# Patient Record
Sex: FEMALE | URBAN - METROPOLITAN AREA
[De-identification: names, ages, dates, MRNs, and addresses within clinical notes are randomized per-mention and may not be internally consistent; named-entity substitution may affect disease eponyms.]

---

## 2022-12-01 ENCOUNTER — EMERGENCY (EMERGENCY)
Facility: HOSPITAL | Age: 32
LOS: 1 days | Discharge: HOME | End: 2022-12-01
Attending: EMERGENCY MEDICINE
Payer: COMMERCIAL

## 2022-12-01 VITALS
HEART RATE: 76 BPM | OXYGEN SATURATION: 99 % | DIASTOLIC BLOOD PRESSURE: 70 MMHG | SYSTOLIC BLOOD PRESSURE: 120 MMHG | TEMPERATURE: 98 F | RESPIRATION RATE: 18 BRPM

## 2022-12-01 VITALS
RESPIRATION RATE: 18 BRPM | WEIGHT: 234.13 LBS | DIASTOLIC BLOOD PRESSURE: 74 MMHG | HEART RATE: 86 BPM | TEMPERATURE: 98 F | SYSTOLIC BLOOD PRESSURE: 131 MMHG | OXYGEN SATURATION: 98 %

## 2022-12-01 LAB
ALBUMIN SERPL ELPH-MCNC: 3.4 G/DL — LOW (ref 3.5–5)
ALP SERPL-CCNC: 63 U/L — SIGNIFICANT CHANGE UP (ref 40–120)
ALT FLD-CCNC: 27 U/L DA — SIGNIFICANT CHANGE UP (ref 10–60)
ANION GAP SERPL CALC-SCNC: 7 MMOL/L — SIGNIFICANT CHANGE UP (ref 5–17)
APTT BLD: 29.6 SEC — SIGNIFICANT CHANGE UP (ref 27.5–35.5)
AST SERPL-CCNC: 27 U/L — SIGNIFICANT CHANGE UP (ref 10–40)
BASOPHILS # BLD AUTO: 0.01 K/UL — SIGNIFICANT CHANGE UP (ref 0–0.2)
BASOPHILS NFR BLD AUTO: 0.1 % — SIGNIFICANT CHANGE UP (ref 0–2)
BILIRUB SERPL-MCNC: 0.4 MG/DL — SIGNIFICANT CHANGE UP (ref 0.2–1.2)
BUN SERPL-MCNC: 11 MG/DL — SIGNIFICANT CHANGE UP (ref 7–18)
CALCIUM SERPL-MCNC: 9.4 MG/DL — SIGNIFICANT CHANGE UP (ref 8.4–10.5)
CHLORIDE SERPL-SCNC: 107 MMOL/L — SIGNIFICANT CHANGE UP (ref 96–108)
CO2 SERPL-SCNC: 25 MMOL/L — SIGNIFICANT CHANGE UP (ref 22–31)
CREAT SERPL-MCNC: 0.77 MG/DL — SIGNIFICANT CHANGE UP (ref 0.5–1.3)
EGFR: 106 ML/MIN/1.73M2 — SIGNIFICANT CHANGE UP
EOSINOPHIL # BLD AUTO: 0.04 K/UL — SIGNIFICANT CHANGE UP (ref 0–0.5)
EOSINOPHIL NFR BLD AUTO: 0.4 % — SIGNIFICANT CHANGE UP (ref 0–6)
GLUCOSE SERPL-MCNC: 101 MG/DL — HIGH (ref 70–99)
HCT VFR BLD CALC: 41.4 % — SIGNIFICANT CHANGE UP (ref 34.5–45)
HGB BLD-MCNC: 13.3 G/DL — SIGNIFICANT CHANGE UP (ref 11.5–15.5)
HIV 1+2 AB+HIV1 P24 AG SERPL QL IA: SIGNIFICANT CHANGE UP
IMM GRANULOCYTES NFR BLD AUTO: 0.4 % — SIGNIFICANT CHANGE UP (ref 0–0.9)
INR BLD: 1.07 RATIO — SIGNIFICANT CHANGE UP (ref 0.88–1.16)
LYMPHOCYTES # BLD AUTO: 3.03 K/UL — SIGNIFICANT CHANGE UP (ref 1–3.3)
LYMPHOCYTES # BLD AUTO: 30.5 % — SIGNIFICANT CHANGE UP (ref 13–44)
MCHC RBC-ENTMCNC: 27.6 PG — SIGNIFICANT CHANGE UP (ref 27–34)
MCHC RBC-ENTMCNC: 32.1 GM/DL — SIGNIFICANT CHANGE UP (ref 32–36)
MCV RBC AUTO: 85.9 FL — SIGNIFICANT CHANGE UP (ref 80–100)
MONOCYTES # BLD AUTO: 0.64 K/UL — SIGNIFICANT CHANGE UP (ref 0–0.9)
MONOCYTES NFR BLD AUTO: 6.5 % — SIGNIFICANT CHANGE UP (ref 2–14)
NEUTROPHILS # BLD AUTO: 6.16 K/UL — SIGNIFICANT CHANGE UP (ref 1.8–7.4)
NEUTROPHILS NFR BLD AUTO: 62.1 % — SIGNIFICANT CHANGE UP (ref 43–77)
NRBC # BLD: 0 /100 WBCS — SIGNIFICANT CHANGE UP (ref 0–0)
PLATELET # BLD AUTO: 278 K/UL — SIGNIFICANT CHANGE UP (ref 150–400)
POTASSIUM SERPL-MCNC: 5 MMOL/L — SIGNIFICANT CHANGE UP (ref 3.5–5.3)
POTASSIUM SERPL-SCNC: 5 MMOL/L — SIGNIFICANT CHANGE UP (ref 3.5–5.3)
PROT SERPL-MCNC: 7.5 G/DL — SIGNIFICANT CHANGE UP (ref 6–8.3)
PROTHROM AB SERPL-ACNC: 12.7 SEC — SIGNIFICANT CHANGE UP (ref 10.5–13.4)
RBC # BLD: 4.82 M/UL — SIGNIFICANT CHANGE UP (ref 3.8–5.2)
RBC # FLD: 12.4 % — SIGNIFICANT CHANGE UP (ref 10.3–14.5)
SARS-COV-2 RNA SPEC QL NAA+PROBE: SIGNIFICANT CHANGE UP
SODIUM SERPL-SCNC: 139 MMOL/L — SIGNIFICANT CHANGE UP (ref 135–145)
TROPONIN I, HIGH SENSITIVITY RESULT: 4.6 NG/L — SIGNIFICANT CHANGE UP
WBC # BLD: 9.92 K/UL — SIGNIFICANT CHANGE UP (ref 3.8–10.5)
WBC # FLD AUTO: 9.92 K/UL — SIGNIFICANT CHANGE UP (ref 3.8–10.5)

## 2022-12-01 PROCEDURE — 0042T: CPT | Mod: MA

## 2022-12-01 PROCEDURE — 93010 ELECTROCARDIOGRAM REPORT: CPT

## 2022-12-01 PROCEDURE — 93005 ELECTROCARDIOGRAM TRACING: CPT

## 2022-12-01 PROCEDURE — 80053 COMPREHEN METABOLIC PANEL: CPT

## 2022-12-01 PROCEDURE — 87635 SARS-COV-2 COVID-19 AMP PRB: CPT

## 2022-12-01 PROCEDURE — 99285 EMERGENCY DEPT VISIT HI MDM: CPT | Mod: 25

## 2022-12-01 PROCEDURE — 84484 ASSAY OF TROPONIN QUANT: CPT

## 2022-12-01 PROCEDURE — 99285 EMERGENCY DEPT VISIT HI MDM: CPT

## 2022-12-01 PROCEDURE — 85025 COMPLETE CBC W/AUTO DIFF WBC: CPT

## 2022-12-01 PROCEDURE — 70498 CT ANGIOGRAPHY NECK: CPT | Mod: MA

## 2022-12-01 PROCEDURE — 85610 PROTHROMBIN TIME: CPT

## 2022-12-01 PROCEDURE — 70450 CT HEAD/BRAIN W/O DYE: CPT | Mod: MA

## 2022-12-01 PROCEDURE — 36415 COLL VENOUS BLD VENIPUNCTURE: CPT

## 2022-12-01 PROCEDURE — 82962 GLUCOSE BLOOD TEST: CPT

## 2022-12-01 PROCEDURE — 87389 HIV-1 AG W/HIV-1&-2 AB AG IA: CPT

## 2022-12-01 PROCEDURE — 70496 CT ANGIOGRAPHY HEAD: CPT | Mod: MA

## 2022-12-01 PROCEDURE — 70498 CT ANGIOGRAPHY NECK: CPT | Mod: 26,MA

## 2022-12-01 PROCEDURE — 85730 THROMBOPLASTIN TIME PARTIAL: CPT

## 2022-12-01 PROCEDURE — 70496 CT ANGIOGRAPHY HEAD: CPT | Mod: 26,MA

## 2022-12-01 NOTE — ED PROVIDER NOTE - PATIENT PORTAL LINK FT
You can access the FollowMyHealth Patient Portal offered by [facility] by registering at the following website: [email or web address]. By joining [employer]’s FollowMyHealth portal, you will also be able to view your health information using other applications (apps) compatible with our system.

## 2022-12-01 NOTE — ED PROVIDER NOTE - OBJECTIVE STATEMENT
Patient with left facial and arm numbness since 9:15 am today. also chest pain. patient has had similar symptoms in past and had neuro workup. Recently 2 weeks ago neurologist prescribed medication for migraine which patient started taking. no cough no URI symptoms. Patient also experienced left eye blurry vision and droopiness of left eyelid. no headache currenlty. EMD gave 325 aspirin

## 2022-12-01 NOTE — ED ADULT NURSE NOTE - OBJECTIVE STATEMENT
Pt arrived to ED ,[facility] from work ,after has had an episode of droopy eyes and Left arm numbness, around 0915 today  On arrival to  ED .,pt moves all extremities and speech is normal, noted  anxious, crying

## 2022-12-01 NOTE — ED PROVIDER NOTE - NSFOLLOWUPINSTRUCTIONS_ED_ALL_ED_FT
Paresthesia      Paresthesia is an abnormal burning or prickling sensation. It is usually felt in the hands, arms, legs, or feet. However, it may occur in any part of the body. Usually, paresthesia is not painful. It may feel like:  •Tingling or numbness.      •Buzzing.      •Itching.      Paresthesia may occur without any clear cause, or it may be caused by:  •Breathing too quickly (hyperventilation).      •Pressure on a nerve.      •An underlying medical condition.      •Side effects of a medication.      •Nutritional deficiencies.      •Exposure to toxic chemicals.      Most people experience temporary (transient) paresthesia at some time in their lives. For some people, it may be long-lasting (chronic) because of an underlying medical condition. If you have paresthesia that lasts a long time, you need to be evaluated by your health care provider.      Follow these instructions at home:      Alcohol use    • Do not drink alcohol if:  •Your health care provider tells you not to drink.      •You are pregnant, may be pregnant, or are planning to become pregnant.      •If you drink alcohol:•Limit how much you use to:  •0–1 drink a day for women.      •0–2 drinks a day for men.        •Be aware of how much alcohol is in your drink. In the [place] one drink equals one 12 oz bottle of beer (355 mL), one 5 oz glass of wine (148 mL), or one 1½ oz glass of hard liquor (44 mL).          Nutrition    •Eat a healthy diet. This includes:  •Eating foods that are high in fiber, such as fresh fruits and vegetables, whole grains, and beans.      •Limiting foods that are high in fat and processed sugars, such as fried or sweet foods.        General instructions     •Take over-the-counter and prescription medicines only as told by your health care provider.      • Do not use any products that contain nicotine or tobacco, such as cigarettes and e-cigarettes. These can keep blood from reaching damaged nerves. If you need help quitting, ask your health care provider.      •If you have diabetes, work closely with your health care provider to keep your blood sugar under control.    •If you have numbness in your feet:  •Check every day for signs of injury or infection. Watch for redness, warmth, and swelling.      •Wear padded socks and comfortable shoes. These help protect your feet.        •Keep all follow-up visits as told by your health care provider. This is important.        Contact a health care provider if you:    •Have paresthesia that gets worse or does not go away.      •Have numbness after an injury.      •Have a burning or prickling feeling that gets worse when you walk.      •Have pain, cramps, or dizziness, or you faint.      •Develop a rash.        Get help right away if you:    •Feel muscle weakness.      •Develop new weakness in an arm or leg.      •Have trouble walking or moving.      •Have problems with speech, understanding, or vision.      •Feel confused.      •Cannot control your bladder or bowel movements.        Summary    •Paresthesia is an abnormal burning or prickling sensation that is usually felt in the hands, arms, legs, or feet. It may also occur in other parts of the body.      •Paresthesia may occur without any clear cause, or it may be caused by breathing too quickly (hyperventilation), pressure on a nerve, an underlying medical condition, side effects of a medication, nutritional deficiencies, or exposure to toxic chemicals.      •If you have paresthesia that lasts a long time, you need to be evaluated by your health care provider.      This information is not intended to replace advice given to you by your health care provider. Make sure you discuss any questions you have with your health care provider.

## 2022-12-01 NOTE — CHART NOTE - NSCHARTNOTEFT_GEN_A_CORE
Briefly:     Patient is a 31yo RH lady who presents to [facility] regarding complaints of left facial and arm numbness since 9:15 am today in the setting of chest pain. Patient has had similar symptoms in past and had neuro workup and had been diagnosed with ocular migraines. Recently 2 weeks ago neurologist prescribed medication for migraine which patient started taking. no cough no URI symptoms. Patient also experienced left eye blurry vision and droopiness of left eyelid which has since resolved. Patient denies any headache currenlty. EMD gave 325 aspirin en route. NIHSS is 1 for L hemisensory disturbance and confirmed with Telestroke examination.      Imaging:    CT Brain Stroke Protocol (12.01.22 @ 10:19)     Impression:  Unremarkable noncontrast head CT.      CT Brain Perfusion Maps Stroke (12.01.22 @ 10:56)     IMPRESSION:   Unremarkable CTA of the neck and [place].  No perfusion mismatch to suggest core infarct or critically hypoperfused   tissue at risk.      Impression: L Hemisensory disturbance unlikely to be     Recommendations:    No TPA given due to mild and nondisabling deficit in addition to actively resolving symptoms  No MT arranged due to no evidence of LVO  Further care per ED team Briefly:     Patient is a 31yo RH lady who presents to [facility] regarding complaints of left facial and arm numbness since 9:15 am today in the setting of chest pain. Patient has had similar symptoms in past and had neuro workup and had been diagnosed with ocular migraines. Recently 2 weeks ago neurologist prescribed medication for migraine which patient started taking. no cough no URI symptoms. Patient also experienced left eye blurry vision and droopiness of left eyelid which has since resolved. Patient denies any headache currenlty. EMD gave 325 aspirin en route. NIHSS is 1 for L hemisensory disturbance and confirmed with Telestroke examination.      Imaging:    CT Brain Stroke Protocol (12.01.22 @ 10:19)     Impression:  Unremarkable noncontrast head CT.      CT Brain Perfusion Maps Stroke (12.01.22 @ 10:56)     IMPRESSION:   Unremarkable CTA of the neck and [place].  No perfusion mismatch to suggest core infarct or critically hypoperfused   tissue at risk.      Impression: L Hemisensory disturbance unlikely to be due to ischemic event, may represent migrainous phenomenon    Recommendations:    No TPA given due to mild and nondisabling deficit in addition to actively resolving symptoms  No MT arranged due to no evidence of LVO  Further care per ED team

## 2022-12-01 NOTE — ED PROVIDER NOTE - PHYSICAL EXAMINATION
awake alert, NIHSS 1, strength intact, face symmetric, speech clear. EOMI PERRLA. subjective diminished sensation to left face arm and leg. no pronator drift, intact finger to nose

## 2022-12-01 NOTE — ED PROVIDER NOTE - CLINICAL SUMMARY MEDICAL DECISION MAKING FREE TEXT BOX
patient with paresthesia and chest pain. code stroke called. stroke neurologist consulted. TPA not recommended based on only sensory deficit. passed dysphagia evaluation. admission recommended for further neurological and cardiac workup, patient declined, will followup with own. primary care physician. symptoms resolved.

## 2024-11-22 ENCOUNTER — EMERGENCY (EMERGENCY)
Facility: HOSPITAL | Age: 34
LOS: 1 days | Discharge: HOME | End: 2024-11-22
Attending: STUDENT IN AN ORGANIZED HEALTH CARE EDUCATION/TRAINING PROGRAM
Payer: SELF-PAY

## 2024-11-22 VITALS
RESPIRATION RATE: 18 BRPM | HEIGHT: 62 IN | OXYGEN SATURATION: 97 % | TEMPERATURE: 98 F | HEART RATE: 71 BPM | DIASTOLIC BLOOD PRESSURE: 93 MMHG | SYSTOLIC BLOOD PRESSURE: 144 MMHG | WEIGHT: 229.94 LBS

## 2024-11-22 VITALS
OXYGEN SATURATION: 98 % | SYSTOLIC BLOOD PRESSURE: 105 MMHG | DIASTOLIC BLOOD PRESSURE: 68 MMHG | HEART RATE: 76 BPM | TEMPERATURE: 98 F | RESPIRATION RATE: 18 BRPM

## 2024-11-22 LAB
ALBUMIN SERPL ELPH-MCNC: 3.5 G/DL — SIGNIFICANT CHANGE UP (ref 3.5–5)
ALP SERPL-CCNC: 61 U/L — SIGNIFICANT CHANGE UP (ref 40–120)
ALT FLD-CCNC: 28 U/L DA — SIGNIFICANT CHANGE UP (ref 10–60)
ANION GAP SERPL CALC-SCNC: 5 MMOL/L — SIGNIFICANT CHANGE UP (ref 5–17)
APTT BLD: 33.7 SEC — SIGNIFICANT CHANGE UP (ref 24.5–35.6)
AST SERPL-CCNC: 15 U/L — SIGNIFICANT CHANGE UP (ref 10–40)
BASOPHILS # BLD AUTO: 0.02 K/UL — SIGNIFICANT CHANGE UP (ref 0–0.2)
BASOPHILS NFR BLD AUTO: 0.2 % — SIGNIFICANT CHANGE UP (ref 0–2)
BILIRUB SERPL-MCNC: 0.3 MG/DL — SIGNIFICANT CHANGE UP (ref 0.2–1.2)
BUN SERPL-MCNC: 15 MG/DL — SIGNIFICANT CHANGE UP (ref 7–18)
CALCIUM SERPL-MCNC: 8.5 MG/DL — SIGNIFICANT CHANGE UP (ref 8.4–10.5)
CHLORIDE SERPL-SCNC: 107 MMOL/L — SIGNIFICANT CHANGE UP (ref 96–108)
CO2 SERPL-SCNC: 25 MMOL/L — SIGNIFICANT CHANGE UP (ref 22–31)
CREAT SERPL-MCNC: 0.67 MG/DL — SIGNIFICANT CHANGE UP (ref 0.5–1.3)
EGFR: 119 ML/MIN/1.73M2 — SIGNIFICANT CHANGE UP
EOSINOPHIL # BLD AUTO: 0.04 K/UL — SIGNIFICANT CHANGE UP (ref 0–0.5)
EOSINOPHIL NFR BLD AUTO: 0.4 % — SIGNIFICANT CHANGE UP (ref 0–6)
GLUCOSE SERPL-MCNC: 96 MG/DL — SIGNIFICANT CHANGE UP (ref 70–99)
HCG SERPL-ACNC: <1 MIU/ML — SIGNIFICANT CHANGE UP
HCT VFR BLD CALC: 36.3 % — SIGNIFICANT CHANGE UP (ref 34.5–45)
HGB BLD-MCNC: 12.5 G/DL — SIGNIFICANT CHANGE UP (ref 11.5–15.5)
IMM GRANULOCYTES NFR BLD AUTO: 0.3 % — SIGNIFICANT CHANGE UP (ref 0–0.9)
INR BLD: 1.08 RATIO — SIGNIFICANT CHANGE UP (ref 0.85–1.16)
LYMPHOCYTES # BLD AUTO: 3.34 K/UL — HIGH (ref 1–3.3)
LYMPHOCYTES # BLD AUTO: 32.7 % — SIGNIFICANT CHANGE UP (ref 13–44)
MCHC RBC-ENTMCNC: 29.3 PG — SIGNIFICANT CHANGE UP (ref 27–34)
MCHC RBC-ENTMCNC: 34.4 G/DL — SIGNIFICANT CHANGE UP (ref 32–36)
MCV RBC AUTO: 85 FL — SIGNIFICANT CHANGE UP (ref 80–100)
MONOCYTES # BLD AUTO: 0.63 K/UL — SIGNIFICANT CHANGE UP (ref 0–0.9)
MONOCYTES NFR BLD AUTO: 6.2 % — SIGNIFICANT CHANGE UP (ref 2–14)
NEUTROPHILS # BLD AUTO: 6.16 K/UL — SIGNIFICANT CHANGE UP (ref 1.8–7.4)
NEUTROPHILS NFR BLD AUTO: 60.2 % — SIGNIFICANT CHANGE UP (ref 43–77)
NRBC # BLD: 0 /100 WBCS — SIGNIFICANT CHANGE UP (ref 0–0)
PLATELET # BLD AUTO: 261 K/UL — SIGNIFICANT CHANGE UP (ref 150–400)
POTASSIUM SERPL-MCNC: 3.8 MMOL/L — SIGNIFICANT CHANGE UP (ref 3.5–5.3)
POTASSIUM SERPL-SCNC: 3.8 MMOL/L — SIGNIFICANT CHANGE UP (ref 3.5–5.3)
PROT SERPL-MCNC: 7.1 G/DL — SIGNIFICANT CHANGE UP (ref 6–8.3)
PROTHROM AB SERPL-ACNC: 12.5 SEC — SIGNIFICANT CHANGE UP (ref 9.9–13.4)
RBC # BLD: 4.27 M/UL — SIGNIFICANT CHANGE UP (ref 3.8–5.2)
RBC # FLD: 11.9 % — SIGNIFICANT CHANGE UP (ref 10.3–14.5)
SODIUM SERPL-SCNC: 137 MMOL/L — SIGNIFICANT CHANGE UP (ref 135–145)
TROPONIN I, HIGH SENSITIVITY RESULT: 4.9 NG/L — SIGNIFICANT CHANGE UP
WBC # BLD: 10.22 K/UL — SIGNIFICANT CHANGE UP (ref 3.8–10.5)
WBC # FLD AUTO: 10.22 K/UL — SIGNIFICANT CHANGE UP (ref 3.8–10.5)

## 2024-11-22 PROCEDURE — 84484 ASSAY OF TROPONIN QUANT: CPT

## 2024-11-22 PROCEDURE — 70498 CT ANGIOGRAPHY NECK: CPT | Mod: MC

## 2024-11-22 PROCEDURE — 70450 CT HEAD/BRAIN W/O DYE: CPT | Mod: 26,MC

## 2024-11-22 PROCEDURE — 99284 EMERGENCY DEPT VISIT MOD MDM: CPT | Mod: 25

## 2024-11-22 PROCEDURE — 0042T: CPT | Mod: MC

## 2024-11-22 PROCEDURE — 96374 THER/PROPH/DIAG INJ IV PUSH: CPT | Mod: XU

## 2024-11-22 PROCEDURE — 99291 CRITICAL CARE FIRST HOUR: CPT

## 2024-11-22 PROCEDURE — 84702 CHORIONIC GONADOTROPIN TEST: CPT

## 2024-11-22 PROCEDURE — 70496 CT ANGIOGRAPHY HEAD: CPT | Mod: MC

## 2024-11-22 PROCEDURE — 85730 THROMBOPLASTIN TIME PARTIAL: CPT

## 2024-11-22 PROCEDURE — 36415 COLL VENOUS BLD VENIPUNCTURE: CPT

## 2024-11-22 PROCEDURE — 99053 MED SERV 10PM-8AM 24 HR FAC: CPT

## 2024-11-22 PROCEDURE — 70498 CT ANGIOGRAPHY NECK: CPT | Mod: 26,MC

## 2024-11-22 PROCEDURE — 82962 GLUCOSE BLOOD TEST: CPT

## 2024-11-22 PROCEDURE — 70450 CT HEAD/BRAIN W/O DYE: CPT | Mod: MC

## 2024-11-22 PROCEDURE — 85025 COMPLETE CBC W/AUTO DIFF WBC: CPT

## 2024-11-22 PROCEDURE — 70496 CT ANGIOGRAPHY HEAD: CPT | Mod: 26,MC

## 2024-11-22 PROCEDURE — 85610 PROTHROMBIN TIME: CPT

## 2024-11-22 PROCEDURE — 80053 COMPREHEN METABOLIC PANEL: CPT

## 2024-11-22 RX ORDER — METOCLOPRAMIDE HCL 10 MG
10 TABLET ORAL ONCE
Refills: 0 | Status: DISCONTINUED | OUTPATIENT
Start: 2024-11-22 | End: 2024-11-22

## 2024-11-22 RX ORDER — ACETAMINOPHEN 500 MG
975 TABLET ORAL ONCE
Refills: 0 | Status: COMPLETED | OUTPATIENT
Start: 2024-11-22 | End: 2024-11-22

## 2024-11-22 RX ORDER — SODIUM CHLORIDE 9 MG/ML
1000 INJECTION, SOLUTION INTRAMUSCULAR; INTRAVENOUS; SUBCUTANEOUS ONCE
Refills: 0 | Status: COMPLETED | OUTPATIENT
Start: 2024-11-22 | End: 2024-11-22

## 2024-11-22 RX ORDER — SODIUM CHLORIDE 9 MG/ML
1000 INJECTION, SOLUTION INTRAMUSCULAR; INTRAVENOUS; SUBCUTANEOUS ONCE
Refills: 0 | Status: DISCONTINUED | OUTPATIENT
Start: 2024-11-22 | End: 2024-11-22

## 2024-11-22 RX ORDER — METOCLOPRAMIDE HCL 10 MG
10 TABLET ORAL ONCE
Refills: 0 | Status: COMPLETED | OUTPATIENT
Start: 2024-11-22 | End: 2024-11-22

## 2024-11-22 RX ORDER — ACETAMINOPHEN 500 MG
975 TABLET ORAL ONCE
Refills: 0 | Status: DISCONTINUED | OUTPATIENT
Start: 2024-11-22 | End: 2024-11-22

## 2024-11-22 RX ADMIN — Medication 10 MILLIGRAM(S): at 05:22

## 2024-11-22 RX ADMIN — SODIUM CHLORIDE 1000 MILLILITER(S): 9 INJECTION, SOLUTION INTRAMUSCULAR; INTRAVENOUS; SUBCUTANEOUS at 05:23

## 2024-11-22 RX ADMIN — Medication 975 MILLIGRAM(S): at 05:22

## 2024-11-22 NOTE — ED PROVIDER NOTE - OBJECTIVE STATEMENT
34-year-old female, hx of migraines (with L hemisensory/visual disturbances), presenting with slurred speech and numbness of bilateral arms/legs, LKN 1:15AM. Also has headaches and nausea. Denies focal weakness. No other neurologic sympoms, She was seen in the ED 2 years ago - code stroke was called for L hemisensory disturbance, normal CTH/CTA, was seen by telestroke team, neurologist impression was migrainous phenomenon resulting in L hemisensory disturbance.    Code stroke activated in triage. FSG 96. /93.

## 2024-11-22 NOTE — ED ADULT TRIAGE NOTE - CODE STROKE DETAILS
Numbness  of all extremities , and left side face numbness .Pt has difficulty speaking .  .CODE STROKE activated

## 2024-11-22 NOTE — ED PROVIDER NOTE - PATIENT PORTAL LINK FT
You can access the FollowMyHealth Patient Portal offered by [facility] by registering at the following website: [email or web address]. By joining [employer]’s FollowMyHealth portal, you will also be able to view your health information using other applications (apps) compatible with our system.

## 2024-11-22 NOTE — ED ADULT TRIAGE NOTE - CHIEF COMPLAINT QUOTE
[facility]  reports that   pt Feeling Numbness  in both arms and both legs and face  and having difficulty speaking  started  1 hour ago .  Pt c/o Chest pain and head ache  about 1 hour ago

## 2024-11-22 NOTE — ED PROVIDER NOTE - NSFOLLOWUPINSTRUCTIONS_ED_ALL_ED_FT
You were seen in the emergency department for: slurred speech/numbness  Your results report is attached. Your symptoms may be due to a complex migraine.   Please take Tylenol 1000mg every 6 hours as needed or Ibuprofen 600mg every 6 hours as needed - you can alternate every 3 hours as needed.   Make sure you stay hydrated.  You will be contacted by the Emergency Department Referrals Coordinator to set up your follow-up appointment within 24-48 hours of your discharge, Monday to Friday. We recommend you follow up with: Neurology    Please return to the Emergency Department if you experience any of the following symptoms:   - Shortness of breath or trouble breathing  - Pressure, pain or tightness in the chest  - Face drooping, arm weakness or speech difficulty  - Persistence of severe vomiting  - Head injury or loss of consciousness  - Nonstop bleeding or an open wound    (1) Follow up with your primary care physician within the next 24-48 hours as discussed. In addition, we did not find evidence of a life threatening illness on your testing here today, but listed below are the specialists that will be necessary to see as an outpatient to continue the workup.  Please call the numbers listed below or [phone] to set up the necessary appointments.  (2) Take Tylenol (up to 1000mg or 1 g)  and/or Motrin (up to 600mg) up to every 6 hours as needed for pain.   (3) If you had an IV (intravenous) line placed, it was removed. Sometimes, after IV removal, that area can be tender for a few days; if it develops redness and swelling, those could be signs of infection; in which case, return to the Emergency Department for assessment.  (4) Please continue taking all of your home medications as directed.

## 2024-11-22 NOTE — ED PROVIDER NOTE - NEUROLOGICAL, MLM
Alert and oriented, no focal deficits, no motor or sensory deficits. +slurred speech - slow but intelligible.

## 2024-11-22 NOTE — ED PROVIDER NOTE - NSFOLLOWUPCLINICS_GEN_ALL_ED_FT
[facility]  Neurology  [address], [address]  [place] [address]  Phone: [phone]  Fax:     [facility]  Neurology  [address] [place] [id number]  Phone: [phone]  Fax:     [facility]  Neurology  [address] [place] [address]  Phone: [phone]  Fax: [phone]

## 2024-11-22 NOTE — ED PROVIDER NOTE - PRINCIPAL DIAGNOSIS
1/26/2021 Patient: Neris Crooks. YOB: 1971 Date of Visit: 1/5/2021 Jody Staples MD 
[address] [place] [address] [address] [facility] [address] [address] [employer] Reyna Lundy MD 
[address] [address] [address] Fax: [phone] Dear MD Reyna Best MD, Thank you for referring Mr. Thelma Springer to  [email or web address]  for evaluation. My notes for this consultation are attached. If you have questions, please do not hesitate to call me. I look forward to following your patient along with you.  
 
 
Sincerely, 
 
Alejo Begum MD 
 

Migraine

## 2024-11-22 NOTE — CHART NOTE - NSCHARTNOTEFT_GEN_A_CORE
Patient was seen over televideo on 11/22/2024 at 3:35 am  HISTORY OF PRESENT ILLNESS:  Patient is a 35 y/o F with a PMHx of complex migraines, who p/w an acute onset of dysarthria, LKW at 1 am. Patient states that she was getting ready for bed, when she suddenly felt numbness on both sides of the body, as well as nausea, dizziness, and headache. She felt chest pain and also had trouble swallowing. She realized that she felt slower than usual and that her speech was coming out slurred. Patient states that she had a similar episode of dysarthria with her previous migraines. She notes that over the past week she has been getting on and off headaches.       PMH/PSH is PAST MEDICAL & SURGICAL HISTORY:      Pre-stroke MRS=      *Modified [place] Score   0 - No symptoms.    OUTSIDE HOSPITAL COURSE:      HOME MEDICATIONS:  Home Medications:        SMOKING/ETOH/RECREATIONAL DRUGS:      VITALS/DATA/ORDERS: [x] Reviewed    CT Head: No acute intracranial hemorrhage, mass effect, or CT evidence of an acute   vascular territorial infarct.  CT PERFUSION:  Technical limitations: None.    Core infarction: 0 ml  Penumbra / tissue at risk for active ischemia: 0 ml    CTA NECK:  No evidence of significant stenosis or occlusion.    CTA HEAD:  No large vessel occlusion, significant stenosis or vascular abnormality   identified.    Labs:  CAPILLARY BLOOD GLUCOSE      POCT Blood Glucose.: 96 mg/dL (22 Nov 2024 02:15)        EXAMINATION: Assisted by nurse Eileen  National Institutes of Health (NIH) Stroke Scale  .....................................................................................................  1a. Assess Level of Consciousness (Alert=0, Coma=3)  Alert (0 points)    1b. Assess Orientation: Month, Age (1 point per bad answer)  Answers both questions correctly (0 points)    1c. Follow Commands: Open and close eyes, make fist and release (1 point per command NOT obeyed)  Performs both tasks correctly (0 points)    2. Follow my finger (Normal=0, Forced deviation=2)  Normal (0 points)    3. Visual field (Normal=0, hemianopia=2, bilateral loss=3)  No visual loss (0 points)    4. Facial palsy:  Show teeth, Raise eyebrows, Squeeze eyes shut (Normal=0, Complete=3)  Normal (0 points)    5a. Motor Strength Left Arm: Elevate to 90 degrees  No drift (0 points)    5b. Motor Strength Right Arm: Elevate to 90 degrees  No drift (0 points)    6a. Motor Strength Left Leg: Elevate to 30 degrees  No drift (0 points)    6b. Motor Strength Right Leg: Elevate to 30 degrees  No drift (0 points)    7. Coordination or limb ataxia: Finger-nose-finger, Heel-knee-shin (Absent=0, both limbs=2)  Absent (0 points)    8. Sensory: Pin prick to face, arm, trunk, and legs, Compare sides (Normal=0, Severe loss=2)  Mild to moderate loss (1 point)    9. Language: Name items, Describe picture, Read sentences (No Aphasia=0, Mute=3)  No aphasia (0 points)    10. Dysarthria: Speech clarity while reading word list (Normal=0, Nearly unintelligible=2)  Mild to moderate dysarthria (1 point)    11. Extinction and Inattention: Formerly called 'Neglect' (None=0, Complete=2)  No abnormality (0 points)    .....................................................................................................  Total score: 2 (maximum=42)    NEUROLOGIC EXAMINATION   General:  Appearance is consistent with chronologic age.  No abnormal facies.   Mental status: The patient is oriented to person, place, time and date.   Fund of knowledge is intact and normal. Speech is slightly hesitant at times with fluctuating dysarthria.. Able to name all objects and can describe a picture. No aphasia. Follows simple commands.  Cranial nerves: Visual fields are intact to finger counting. EOMI w/o nystagmus or skew.  PERRL.  No ptosis.  No facial asymmetry.  Decreased sensation to light touch on the left side of the face. Slight twitching of the left eye at times which patient notes occurs with her usual migraines.  Motor examination:   No tremors or involuntary movements. No drifts.  Sensory examination:   slightly decreased to light touch on both arms   Cerebellum:   FTN is intact        IV Alteplase CONTRAINDICATIONS  [] None    ABSOLUTE EXCLUSION CRITERIA:  [] Patient outside of the appropriate time window for IV alteplase  [] Evidence of intracranial hemorrhage on pretreatment CT scan (CT must be read by an attending radiologist or attending neurologist)  [] Head CT findings suggesting an established acute cerebral infarction as evidenced by sofia hypodensity, regardless of size.  [] Clinical presentation consistent with subarachnoid hemorrhage, even with normal CT scan  [] Active internal bleeding  [] Known bleeding diathesis (including but not limited to platelet count < 100,000, use of oral anticoagulants with INR>1.7, use of full dose low molecular       weight heparin within the last 24 hours, use of unfractionated heparin AND a prolonged PTT (> 40sec), use of direct thrombin inhibitor (e.g. dabigatran) or oral direct Factor Xa inhibitor (e.g. rivaroxiban, apixaban) within 48 hours)  [] Systolic pressure >= 185 mmHg or diastolic pressure 110 mmHg on repeated measurements at the time treatment is to begin  [] Care team unable to determine eligibility for IV alteplase  [] Patient, family, or surrogate declines and understands risks and benefits of treatment.  [] For wake-up Protocol patients: DW-MRI lesions larger than one-third of the MCA territory      RELATIVE EXCLUSION CRITERIA    [] Isolated neurological deficits (except for aphasia or hemianopsia)  [x] stroke severity too mild (non-disabling)  [] Seizure at onset with post-ictal residual neurological impairment  [] Gastrointestinal, genitourinary or respiratory hemorrhage within 21 days   [] Major surgery within 14 days   [] Blood glucose level < 50 or > 400 mG/dL  [] CPR with chest compressions or minor surgery (including liver and kidney biopsy, thoracocentesis, lumbar puncture) within 10 days   [] Arterial puncture at non-compressible site within 7 days   [] Evidence of acute trauma (fracture)   [] Diabetic hemorrhagic retinopathy or ophthalmic bleeding  [] Pregnancy or peripartum; no nursing post- treatment  [] Post-myocardial infarction pericarditis  [] Peritoneal dialysis or hemodialysis or sever hepatic disease   [] Known bacterial endocarditis   [] Life expectancy less than 6 months or sever co-morbid illness   [] Known cerebral vascular malformation, untreated intracranial aneurysm or brain tumor (may consider IV alteplase in patients with CNS lesions that have a very low likelihood of hemorrhage, such as small un-ruptured aneurysms or benign tumors with low vascularity.  [] Social/Catholic reason  [] Other ____________________        ASSESSMENT:  Patient is a 35 y/o F with a PMHx of complex migraines, who p/w an acute onset of dysarthria, dizziness, nausea, headache, and numbness on both sides. On exam over video, patient has fluctuating dysarthria which improves throughout the exam. Patient also has numbness on both sides. NIHSS of 2. CTH read no acute infarct or hemorrhage. CTA read no LVO and CTP read no perfusion deficits. Patient is not a current TNK candidate due to rapid improvement of symptoms and as current mild dysarthria and numbness is not disabling to patient. She is not a current neuro IR candidate as there is no LVO on vessel imaging.          RECOMMENDATIONS:  [] general neurology consult              Consultation provided via live, two-way video streaming. Consultation provided in patient's preferred language.

## 2024-11-22 NOTE — ED PROVIDER NOTE - CLINICAL SUMMARY MEDICAL DECISION MAKING FREE TEXT BOX
34-year-old female, hx of migraines (with L hemisensory/visual disturbances), presenting with slurred speech and numbness of bilateral arms/legs, LKN 1:15AM. Code stroke activated. CTH normal. Telestroke called, spoke to Stroke PA, will provide final recs. 34-year-old female, hx of migraines (with L hemisensory/visual disturbances), presenting with slurred speech and numbness of bilateral arms/legs, LKN 1:15AM. Code stroke activated. CTH normal. Telestroke called, spoke to Stroke PA, low suspicion for stroke, no indication for LVO or TNK. Patient feels better after Tylenol/Reglan/IVF, wants to go home. Has seen multiple neurologists in the past about her complex migraines, has had multiple normal MRIs. Will d/c with outpatient Neurology followup. Strict return precautions provided.

## 2024-11-22 NOTE — ED PROVIDER NOTE - CRITICAL CARE ATTENDING CONTRIBUTION TO CARE
The patient's condition is critical. Management options were put in place to ensure further deterioration does not occur. In addition to the usual care provided, I have spent additional time with this patient through but not limited to the following:   additional documentation  reviewing test results,  discussing with consultants (Neurology)  discussing with patient / patient's family prognosis and course of care,  reassessment of patient's status and response to interventions.

## 2025-01-07 ENCOUNTER — APPOINTMENT (OUTPATIENT)
Dept: NEUROLOGY | Facility: HOSPITAL | Age: 35
End: 2025-01-07

## 2025-01-07 ENCOUNTER — OUTPATIENT (OUTPATIENT)
Dept: OUTPATIENT SERVICES | Facility: HOSPITAL | Age: 35
LOS: 1 days | End: 2025-01-07
Payer: SELF-PAY

## 2025-01-07 VITALS
BODY MASS INDEX: 42.33 KG/M2 | HEART RATE: 72 BPM | HEIGHT: 62 IN | OXYGEN SATURATION: 97 % | DIASTOLIC BLOOD PRESSURE: 83 MMHG | RESPIRATION RATE: 18 BRPM | WEIGHT: 230 LBS | TEMPERATURE: 96.2 F | SYSTOLIC BLOOD PRESSURE: 128 MMHG

## 2025-01-07 DIAGNOSIS — G43.109 MIGRAINE WITH AURA, NOT INTRACTABLE, W/OUT STATUS MIGRAINOSUS: ICD-10-CM

## 2025-01-07 DIAGNOSIS — G51.4 FACIAL MYOKYMIA: ICD-10-CM

## 2025-01-07 DIAGNOSIS — R51.9 HEADACHE, UNSPECIFIED: ICD-10-CM

## 2025-01-07 PROBLEM — Z00.00 ENCOUNTER FOR PREVENTIVE HEALTH EXAMINATION: Status: ACTIVE | Noted: 2025-01-07

## 2025-01-07 PROCEDURE — G0463: CPT

## 2025-01-07 RX ORDER — OMEGA-3/DHA/EPA/FISH OIL 300-1000MG
400 CAPSULE ORAL DAILY
Qty: 60 | Refills: 0 | Status: ACTIVE | COMMUNITY
Start: 2025-01-07 | End: 1900-01-01

## 2025-01-07 RX ORDER — RIZATRIPTAN BENZOATE 10 MG/1
10 TABLET ORAL AS DIRECTED
Qty: 60 | Refills: 0 | Status: ACTIVE | COMMUNITY
Start: 2025-01-07 | End: 1900-01-01

## 2025-01-09 DIAGNOSIS — G51.4 FACIAL MYOKYMIA: ICD-10-CM

## 2025-01-09 DIAGNOSIS — G43.109 MIGRAINE WITH AURA, NOT INTRACTABLE, WITHOUT STATUS MIGRAINOSUS: ICD-10-CM

## 2025-01-13 ENCOUNTER — APPOINTMENT (OUTPATIENT)
Dept: NEUROLOGY | Facility: CLINIC | Age: 35
End: 2025-01-13

## 2025-01-13 PROCEDURE — 95816 EEG AWAKE AND DROWSY: CPT

## 2025-01-14 ENCOUNTER — TRANSCRIPTION ENCOUNTER (OUTPATIENT)
Age: 35
End: 2025-01-14

## 2025-01-14 ENCOUNTER — APPOINTMENT (OUTPATIENT)
Dept: MRI IMAGING | Facility: CLINIC | Age: 35
End: 2025-01-14

## 2025-04-22 ENCOUNTER — APPOINTMENT (OUTPATIENT)
Dept: NEUROLOGY | Facility: HOSPITAL | Age: 35
End: 2025-04-22